# Patient Record
Sex: MALE | Race: WHITE
[De-identification: names, ages, dates, MRNs, and addresses within clinical notes are randomized per-mention and may not be internally consistent; named-entity substitution may affect disease eponyms.]

---

## 2018-09-20 ENCOUNTER — HOSPITAL ENCOUNTER (INPATIENT)
Dept: HOSPITAL 74 - YASAS | Age: 28
LOS: 3 days | Discharge: LEFT BEFORE DISCHARGE | DRG: 770 | End: 2018-09-23
Attending: INTERNAL MEDICINE | Admitting: INTERNAL MEDICINE
Payer: COMMERCIAL

## 2018-09-20 VITALS — BODY MASS INDEX: 25.1 KG/M2

## 2018-09-20 DIAGNOSIS — F12.20: ICD-10-CM

## 2018-09-20 DIAGNOSIS — F11.23: Primary | ICD-10-CM

## 2018-09-20 DIAGNOSIS — F17.213: ICD-10-CM

## 2018-09-20 LAB
APPEARANCE UR: CLEAR
BILIRUB UR STRIP.AUTO-MCNC: NEGATIVE MG/DL
COLOR UR: YELLOW
KETONES UR QL STRIP: NEGATIVE
LEUKOCYTE ESTERASE UR QL STRIP.AUTO: NEGATIVE
NITRITE UR QL STRIP: NEGATIVE
PH UR: 5 [PH] (ref 5–8)
PROT UR QL STRIP: NEGATIVE
PROT UR QL STRIP: NEGATIVE
SP GR UR: 1.02 (ref 1–1.03)
UROBILINOGEN UR STRIP-MCNC: 2 MG/DL (ref 0.2–1)

## 2018-09-20 PROCEDURE — HZ2ZZZZ DETOXIFICATION SERVICES FOR SUBSTANCE ABUSE TREATMENT: ICD-10-PCS | Performed by: INTERNAL MEDICINE

## 2018-09-20 RX ADMIN — Medication SCH MG: at 22:52

## 2018-09-20 NOTE — HP
Admission [facility]


Chief Complaint: 





patient here requesting detox from heroin use , reports 10-16 bags/day IVDU  in 

[facility]  , needles  from the pharmacy , denies sharing , + re-using , denies 

abscess  , denies OD  . Heroin x 5 yrs  , initially via  inhalation , x 4 yrs  

IVDU . Latest use  heroin yesterday morning , currently reports abd  cramps, 

bodyaches, tearing, runny nose , + chills . This is  his 2 nd  detox , prior  

in halfway 2018 [facility]  , 2 yrs ago . intermittent  periods of sobriety , longest 1 

yr .


cannabis  i the past - started at age  14  ,  now sporadic use  


crack cocaine  in the past  : first age  " years ago " , latest  1 week ago  


states took a xanax yesterday , denies regular use 


denies other illicits  


 tobacco : 1 ppd x 12  yrs , requesting nrt w/ gum 


 utox + thc, opi, bzo  


ludin  0.000 


pmhx : denies 


 pshx : denies  


psych : denies , denies SI / HI  


allergies : NKDA 


driving  after use 


legal : 2 cases  open for  possession [place] and [facility]  10/18 


homeless  


no children 


employed as [employer] 


Allergies/Adverse Reactions: 


 Allergies











Allergy/AdvReac Type Severity Reaction Status Date / Time


 


No Known Allergies Allergy   Verified 09/20/18 11:59











Exam Limitations: No Limitations





- Ebola screening


Have you traveled outside of the country in the last 21 days: No


Have you had contact with anyone from an Ebola affected area: No


Have you been sick,other than usual withdrawal symptoms: No


Do you have a fever: No





- Review of Systems


Constitutional: Chills, Loss of Appetite, Malaise


EENT: reports: No Symptoms Reported


Respiratory: reports: No Symptoms reported


Cardiac: reports: No Symptoms Reported


GI: reports: Nausea


: reports: No Symptoms Reported


Musculoskeletal: reports: No Symptoms Reported


Integumentary: reports: No Symptoms Reported


Neuro: reports: Numbness, Paresthesia, Weakness, Other (r wrist unable to 

dorsilex x 2 days  , fell asleep  on chair with arm over  back of chair and  

head on dorsiF hand)


Endocrine: reports: No Symptoms Reported


Hematology: reports: No Symptoms Reported


Psychiatric: reports: No Sypmtoms Reported, Judgement Intact, Orientated x3


Other Systems: Reviewed and Negative





Patient History





- Smoking Cessation


Smoking history: Current every day smoker


Have you smoked in the past 12 months: Yes


Aproximately how many cigarettes per day: 20


Initiated information on smoking cessation: No





Family Disease History





- Family Disease History


Family History: Denies





Admission Physical Exam [facility]





- Vital Signs


Vital Signs: 


 Vital Signs - 24 hr











  09/20/18





  11:11


 


Temperature 97.5 F L


 


Pulse Rate 73


 


Respiratory 18





Rate 


 


Blood Pressure 137/78














- Physical


General Appearance: Yes: Within Normal Limits, Nourished, Appropriately Dressed

, Mild Distress


HEENTM: Yes: EOMI, Hearing grossly Normal, Normal ENT Inspection, Normocephalic

, Normal Voice, GUIDO, Pharynx Normal, Tm's normal, Other (pupils dilated)


Respiratory: Yes: Chest Non-Tender, No Respiratory Distress, No Accessory 

Muscle Use, Wheezing


Neck: Yes: Within Normal Limits, No masses,lesions,Nodules, Trachea in good 

position


Cardiology: Yes: Within Normal Limits, Regular Rhythm, Regular Rate


Abdominal: Yes: Within Normal Limits, Normal Bowel Sounds, Non Tender, Flat, 

Soft


Back: Yes: Within Normal Limits, Normal Inspection


Musculoskeletal: Yes: full range of Motion, Gait Steady, Pelvis Stable, Muscle 

weakness (right wrist dorsiF)


Extremities: Yes: Within Normal Limits, Normal Capillary Refill, Normal 

Inspection, Normal Range of Motion, Non-Tender


Neurological: Yes: Within Normal Limits, Fully Oriented, Alert, Normal Mood/

Affect, Normal Response, Other (r wrist palsy)


Integumentary: Yes: Normal Color, Dry, Warm, Track Marks, Other


Lymphatic: Yes: Within Normal Limits





[employer] Breath Alcohol Content


Breath Alcohol Content: 0





Urine Drug Screen





- Results


Drug Screen Negative: No


Urine Drug Screen Results: THC-Marijuana, OPI-Opiates, BZO-Benzodiazepines

## 2018-09-20 NOTE — PN
[facility] COWS





- Scale


Resting Pulse: 0= [place] 80 or Below


Sweatin= Chills/Flushing


Restless Observation: 0= Sits Still


Pupil Size: 2= Moderately Dilated


Bone or Joint Aches: 2= Severe Diffuse Aches


Runny Nose/ Eye Tearin= Runny Nose/Eyes


GI Upset > 30mins: 1= Stomach Cramp


Tremor Observation of Outstretched Hands: 0= None


Yawning Observation: 0= None


Anxiety or Irritability: 1=Feels Anxious/Irritable


Goose Flesh Skin: 0=Smooth Skin


COWS Score: 9

## 2018-09-21 LAB
ALBUMIN SERPL-MCNC: 4.1 G/DL (ref 3.4–5)
ALP SERPL-CCNC: 95 U/L (ref 45–117)
ALT SERPL-CCNC: 35 U/L (ref 13–61)
ANION GAP SERPL CALC-SCNC: 9 MMOL/L (ref 8–16)
AST SERPL-CCNC: 29 U/L (ref 15–37)
BILIRUB SERPL-MCNC: 0.5 MG/DL (ref 0.2–1)
BUN SERPL-MCNC: 14 MG/DL (ref 7–18)
CALCIUM SERPL-MCNC: 10.7 MG/DL (ref 8.5–10.1)
CHLORIDE SERPL-SCNC: 106 MMOL/L (ref 98–107)
CO2 SERPL-SCNC: 24 MMOL/L (ref 21–32)
CREAT SERPL-MCNC: 0.9 MG/DL (ref 0.55–1.3)
DEPRECATED RDW RBC AUTO: 13.6 % (ref 11.9–15.9)
GLUCOSE SERPL-MCNC: 97 MG/DL (ref 74–106)
HCT VFR BLD CALC: 47.5 % (ref 35.4–49)
HGB BLD-MCNC: 15.8 GM/DL (ref 11.7–16.9)
MCH RBC QN AUTO: 28.9 PG (ref 25.7–33.7)
MCHC RBC AUTO-ENTMCNC: 33.4 G/DL (ref 32–35.9)
MCV RBC: 86.6 FL (ref 80–96)
PLATELET # BLD AUTO: 336 K/MM3 (ref 134–434)
PMV BLD: 9.2 FL (ref 7.5–11.1)
POTASSIUM SERPLBLD-SCNC: 4.8 MMOL/L (ref 3.5–5.1)
PROT SERPL-MCNC: 8.5 G/DL (ref 6.4–8.2)
RBC # BLD AUTO: 5.48 M/MM3 (ref 4–5.6)
SODIUM SERPL-SCNC: 139 MMOL/L (ref 136–145)
WBC # BLD AUTO: 9.2 K/MM3 (ref 4–10)

## 2018-09-21 RX ADMIN — HYDROXYZINE PAMOATE PRN MG: 50 CAPSULE ORAL at 10:10

## 2018-09-21 RX ADMIN — Medication PRN MG: at 22:14

## 2018-09-21 RX ADMIN — Medication SCH MG: at 22:14

## 2018-09-21 RX ADMIN — Medication SCH TAB: at 10:10

## 2018-09-21 NOTE — PN
[facility] COWS





- Scale


Resting Pulse: 0= [place] 80 or Below


Sweatin= Chills/Flushing


Restless Observation: 3= Extraneous Movement


Pupil Size: 0= Normal to Room Light


Bone or Joint Aches: 4=Acute Joint/Muscle Pain


Runny Nose/ Eye Tearin= Constantly Teary/Runny


GI Upset > 30mins: 0= None


Tremor Observation of Outstretched Hands: 0= None


Yawning Observation: 0= None


Anxiety or Irritability: 2=Irritable/Anxious


Goose Flesh Skin: 0=Smooth Skin


COWS Score: 14





BHS Progress Note (SOAP)


Subjective: 





ANXIETY,IRRITABILITY,RESTLESSNESS,HOT/COLD CHILLS,TEARY EYES,RUNNY NOSE. 


Objective: 





18 13:58


 Vital Signs











  18





  06:20 10:18


 


Temperature 98 F 97.4 F L


 


Pulse Rate 41 L 60


 


Respiratory 16 18





Rate  


 


Blood Pressure 111/65 142/82








 Laboratory Tests











  18





  15:00 06:00 06:00


 


WBC   9.2 


 


RBC   5.48 


 


Hgb   15.8 


 


Hct   47.5 


 


MCV   86.6 


 


MCH   28.9 


 


MCHC   33.4 


 


RDW   13.6 


 


Plt Count   336 


 


MPV   9.2 


 


Sodium    139


 


Potassium    4.8


 


Chloride    106


 


Carbon Dioxide    24


 


Anion Gap    9


 


BUN    14


 


Creatinine    0.9


 


Creat Clearance w eGFR    > 60


 


Random Glucose    97


 


Calcium    10.7 H


 


Total Bilirubin    0.5


 


AST    29


 


ALT    35


 


Alkaline Phosphatase    95


 


Total Protein    8.5 H


 


Albumin    4.1


 


Urine Color  Yellow  


 


Urine Appearance  Clear  


 


Urine pH  5.0  


 


Ur Specific Gravity  1.021  


 


Urine Protein  Negative  


 


Urine Glucose (UA)  Negative  


 


Urine Ketones  Negative  


 


Urine Blood  Negative  


 


Urine Nitrite  Negative  


 


Urine Bilirubin  Negative  


 


Urine Urobilinogen  2.0  


 


Ur Leukocyte Esterase  Negative  











Assessment: 





18 13:58


WITHDRAWAL SX


Plan: 





CONTINUE DETOX


ACTIFED PRN GIVEN

## 2018-09-21 NOTE — EKG
Test Reason : 

Blood Pressure : ***/*** mmHG

Vent. Rate : 049 BPM     Atrial Rate : 049 BPM

   P-R Int : 156 ms          QRS Dur : 090 ms

    QT Int : 416 ms       P-R-T Axes : 027 021 046 degrees

   QTc Int : 375 ms

 

SINUS BRADYCARDIA WITH PREMATURE ATRIAL COMPLEXES

OTHERWISE NORMAL ECG

NO PREVIOUS ECGS AVAILABLE

Confirmed by SINDY SORTO, MEGAN ([id number]) on 9/21/2018 8:02:00 AM

 

Referred By:             Confirmed By:MEGAN CALLAHAN MD

## 2018-09-22 RX ADMIN — Medication SCH TAB: at 10:01

## 2018-09-22 RX ADMIN — HYDROXYZINE PAMOATE PRN MG: 50 CAPSULE ORAL at 10:04

## 2018-09-22 RX ADMIN — HYDROXYZINE PAMOATE PRN MG: 50 CAPSULE ORAL at 22:18

## 2018-09-22 RX ADMIN — Medication SCH MG: at 22:19

## 2018-09-22 RX ADMIN — Medication PRN MG: at 22:18

## 2018-09-22 NOTE — PN
[facility] COWS





- Scale


Resting Pulse: 0= [place] 80 or Below


Sweatin= Chills/Flushing


Restless Observation: 3= Extraneous Movement


Pupil Size: 0= Normal to Room Light


Bone or Joint Aches: 1= Mild Discomfort


Runny Nose/ Eye Tearin= Nasal Congestion


GI Upset > 30mins: 0= None


Tremor Observation of Outstretched Hands: 1= Tremor Felt, Not Seen


Yawning Observation: 1= 1-2x During Session


Anxiety or Irritability: 2=Irritable/Anxious


Goose Flesh Skin: 0=Smooth Skin


COWS Score: 10





BHS Progress Note (SOAP)


Subjective: 





ANXIETY, IRRITABILITY, SWEATS/CHILLS.


Objective: 





18 10:58


 Vital Signs











  18





  03:30 06:03 09:13


 


Temperature  97 F L 98.4 F


 


Pulse Rate  41 L 63


 


Respiratory 18 18 216 H





Rate   


 


Blood Pressure  115/67 132/78








 Laboratory Tests











  18





  15:00 06:00 06:00


 


WBC   9.2 


 


RBC   5.48 


 


Hgb   15.8 


 


Hct   47.5 


 


MCV   86.6 


 


MCH   28.9 


 


MCHC   33.4 


 


RDW   13.6 


 


Plt Count   336 


 


MPV   9.2 


 


Sodium    139


 


Potassium    4.8


 


Chloride    106


 


Carbon Dioxide    24


 


Anion Gap    9


 


BUN    14


 


Creatinine    0.9


 


Creat Clearance w eGFR    > 60


 


Random Glucose    97


 


Calcium    10.7 H


 


Total Bilirubin    0.5


 


AST    29


 


ALT    35


 


Alkaline Phosphatase    95


 


Total Protein    8.5 H


 


Albumin    4.1


 


Urine Color  Yellow  


 


Urine Appearance  Clear  


 


Urine pH  5.0  


 


Ur Specific Gravity  1.021  


 


Urine Protein  Negative  


 


Urine Glucose (UA)  Negative  


 


Urine Ketones  Negative  


 


Urine Blood  Negative  


 


Urine Nitrite  Negative  


 


Urine Bilirubin  Negative  


 


Urine Urobilinogen  2.0  


 


Ur Leukocyte Esterase  Negative  


 


RPR Titer   














  18





  06:00


 


WBC 


 


RBC 


 


Hgb 


 


Hct 


 


MCV 


 


MCH 


 


MCHC 


 


RDW 


 


Plt Count 


 


MPV 


 


Sodium 


 


Potassium 


 


Chloride 


 


Carbon Dioxide 


 


Anion Gap 


 


BUN 


 


Creatinine 


 


Creat Clearance w eGFR 


 


Random Glucose 


 


Calcium 


 


Total Bilirubin 


 


AST 


 


ALT 


 


Alkaline Phosphatase 


 


Total Protein 


 


Albumin 


 


Urine Color 


 


Urine Appearance 


 


Urine pH 


 


Ur Specific Gravity 


 


Urine Protein 


 


Urine Glucose (UA) 


 


Urine Ketones 


 


Urine Blood 


 


Urine Nitrite 


 


Urine Bilirubin 


 


Urine Urobilinogen 


 


Ur Leukocyte Esterase 


 


RPR Titer  Nonreactive











Assessment: 





18 10:58


WITHDRAWAL SX


Plan: 





CONTINUE DETOX


INCREASE PO FLUIDS

## 2018-09-23 VITALS — TEMPERATURE: 97.9 F | DIASTOLIC BLOOD PRESSURE: 79 MMHG | SYSTOLIC BLOOD PRESSURE: 142 MMHG | HEART RATE: 89 BPM

## 2018-09-23 RX ADMIN — HYDROXYZINE PAMOATE PRN MG: 50 CAPSULE ORAL at 10:10

## 2018-09-23 RX ADMIN — Medication SCH TAB: at 10:09

## 2018-09-23 NOTE — DS
[facility] Detox Discharge Summary


Admission Date: 


09/20/18





Discharge Date: 09/23/18





- History


Present History: Cannabis Dependence, Opioid Dependence


Pertinent Past History: 





Denies





- Physical Exam Results


Vital Signs: 


 Vital Signs











Temperature  97.9 F   09/23/18 13:20


 


Pulse Rate  89   09/23/18 13:20


 


Respiratory Rate  18   09/23/18 13:20


 


Blood Pressure  142/79   09/23/18 13:20


 


O2 Sat by Pulse Oximetry (%)      











Pertinent Admission Physical Exam Findings: 





Withdrawal symptoms





 Laboratory Tests











  09/20/18 09/21/18 09/21/18





  15:00 06:00 06:00


 


WBC   9.2 


 


RBC   5.48 


 


Hgb   15.8 


 


Hct   47.5 


 


MCV   86.6 


 


MCH   28.9 


 


MCHC   33.4 


 


RDW   13.6 


 


Plt Count   336 


 


MPV   9.2 


 


Sodium    139


 


Potassium    4.8


 


Chloride    106


 


Carbon Dioxide    24


 


Anion Gap    9


 


BUN    14


 


Creatinine    0.9


 


Creat Clearance w eGFR    > 60


 


Random Glucose    97


 


Calcium    10.7 H


 


Total Bilirubin    0.5


 


AST    29


 


ALT    35


 


Alkaline Phosphatase    95


 


Total Protein    8.5 H


 


Albumin    4.1


 


Urine Color  Yellow  


 


Urine Appearance  Clear  


 


Urine pH  5.0  


 


Ur Specific Gravity  1.021  


 


Urine Protein  Negative  


 


Urine Glucose (UA)  Negative  


 


Urine Ketones  Negative  


 


Urine Blood  Negative  


 


Urine Nitrite  Negative  


 


Urine Bilirubin  Negative  


 


Urine Urobilinogen  2.0  


 


Ur Leukocyte Esterase  Negative  


 


RPR Titer   














  09/21/18





  06:00


 


WBC 


 


RBC 


 


Hgb 


 


Hct 


 


MCV 


 


MCH 


 


MCHC 


 


RDW 


 


Plt Count 


 


MPV 


 


Sodium 


 


Potassium 


 


Chloride 


 


Carbon Dioxide 


 


Anion Gap 


 


BUN 


 


Creatinine 


 


Creat Clearance w eGFR 


 


Random Glucose 


 


Calcium 


 


Total Bilirubin 


 


AST 


 


ALT 


 


Alkaline Phosphatase 


 


Total Protein 


 


Albumin 


 


Urine Color 


 


Urine Appearance 


 


Urine pH 


 


Ur Specific Gravity 


 


Urine Protein 


 


Urine Glucose (UA) 


 


Urine Ketones 


 


Urine Blood 


 


Urine Nitrite 


 


Urine Bilirubin 


 


Urine Urobilinogen 


 


Ur Leukocyte Esterase 


 


RPR Titer  Nonreactive








Labs reviewed





- Medication


Discharge Medications: 


Ambulatory Orders





NK [No Known Home Medication]  09/20/18 











- Diagnosis


(1) Cannabis dependence, uncomplicated


Current Visit: Yes   Status: Acute   





(2) Nicotine dependence


Current Visit: Yes   Status: Acute   


Qualifiers: 


   Nicotine product type: cigarettes   Substance use status: in withdrawal   

Qualified Code(s): F17.213 - Nicotine dependence, cigarettes, with withdrawal   





(3) Opioid dependence with withdrawal


Current Visit: Yes   Status: Acute   





- AMA


Did Patient Leave Against Medical Advice: Yes (Proceed to ER stat if withdrawal 

sxs; F/U with your PCP in 1-3 days)